# Patient Record
Sex: MALE | Race: WHITE
[De-identification: names, ages, dates, MRNs, and addresses within clinical notes are randomized per-mention and may not be internally consistent; named-entity substitution may affect disease eponyms.]

---

## 2014-10-11 VITALS — SYSTOLIC BLOOD PRESSURE: 136 MMHG | DIASTOLIC BLOOD PRESSURE: 84 MMHG

## 2020-01-20 ENCOUNTER — HOSPITAL ENCOUNTER (OUTPATIENT)
Dept: HOSPITAL 63 - DXRAD | Age: 40
Discharge: HOME | End: 2020-01-20
Attending: PHYSICIAN ASSISTANT
Payer: COMMERCIAL

## 2020-01-20 DIAGNOSIS — S82.831K: Primary | ICD-10-CM
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
## 2020-01-20 PROCEDURE — 73630 X-RAY EXAM OF FOOT: CPT

## 2020-01-20 NOTE — RAD
Examination: FOOT RIGHT 3V

 

History: Fall, pain at the lateral aspect

 

Comparison/Correlation: None

 

Findings: Total of 3 images of the right foot were obtained. Joint spaces 

are within normal limits. No acute displaced fracture or bony destructive 

findings. Nonunion distal fibular fracture is evident. Deformity of the 

distal tibia is notable preserved same previous trauma. Lucency involving 

the distal tibial diametaphyseal region is noted expansile appearance. 

Multiple lucencies involving the distal tibia corresponding to previous 

screw or pin placement noted..

 

 

Impression:

No evidence of acute fracture.

 

Deformity of distal tibia and fibula. These are presumed posttraumatic and

postoperative. Correlate with previous exams available. In particular, 

expansile appearance of the distal tibial metaphysis is present although 

may relate to trauma history. No cortical destruction is seen at this 

site.

 

Electronically signed by: Blaze Lugo MD (1/20/2020 3:05 PM) [facility]

## 2020-11-20 ENCOUNTER — HOSPITAL ENCOUNTER (EMERGENCY)
Dept: HOSPITAL 63 - ER | Age: 40
Discharge: HOME | End: 2020-11-20
Payer: COMMERCIAL

## 2020-11-20 VITALS — WEIGHT: 220.46 LBS | BODY MASS INDEX: 28.29 KG/M2 | HEIGHT: 74 IN

## 2020-11-20 VITALS — DIASTOLIC BLOOD PRESSURE: 79 MMHG | SYSTOLIC BLOOD PRESSURE: 111 MMHG

## 2020-11-20 DIAGNOSIS — M25.571: ICD-10-CM

## 2020-11-20 DIAGNOSIS — Z98.890: ICD-10-CM

## 2020-11-20 DIAGNOSIS — M79.671: Primary | ICD-10-CM

## 2020-11-20 DIAGNOSIS — L53.9: ICD-10-CM

## 2020-11-20 LAB
ALBUMIN SERPL-MCNC: 4.2 G/DL (ref 3.4–5)
ALBUMIN/GLOB SERPL: 1.1 {RATIO} (ref 1–1.7)
ALP SERPL-CCNC: 81 U/L (ref 46–116)
ALT SERPL-CCNC: 38 U/L (ref 16–63)
ANION GAP SERPL CALC-SCNC: 9 MMOL/L (ref 6–14)
AST SERPL-CCNC: 17 U/L (ref 15–37)
BASOPHILS # BLD AUTO: 0.1 X10^3/UL (ref 0–0.2)
BASOPHILS NFR BLD: 1 % (ref 0–3)
BILIRUB SERPL-MCNC: 0.3 MG/DL (ref 0.2–1)
BUN/CREAT SERPL: 15 (ref 6–20)
CA-I SERPL ISE-MCNC: 16 MG/DL (ref 8–26)
CALCIUM SERPL-MCNC: 9.2 MG/DL (ref 8.5–10.1)
CHLORIDE SERPL-SCNC: 103 MMOL/L (ref 98–107)
CO2 SERPL-SCNC: 26 MMOL/L (ref 21–32)
CREAT SERPL-MCNC: 1.1 MG/DL (ref 0.7–1.3)
CRP SERPL-MCNC: 0.9 MG/L (ref 0–3.3)
EOSINOPHIL NFR BLD: 0.1 X10^3/UL (ref 0–0.7)
EOSINOPHIL NFR BLD: 1 % (ref 0–3)
ERYTHROCYTE [DISTWIDTH] IN BLOOD BY AUTOMATED COUNT: 12.8 % (ref 11.5–14.5)
GFR SERPLBLD BASED ON 1.73 SQ M-ARVRAT: 74.9 ML/MIN
GLOBULIN SER-MCNC: 3.7 G/DL (ref 2.2–3.8)
GLUCOSE SERPL-MCNC: 84 MG/DL (ref 70–99)
HCT VFR BLD CALC: 46 % (ref 39–53)
HGB BLD-MCNC: 15.2 G/DL (ref 13–17.5)
LYMPHOCYTES # BLD: 2.7 X10^3/UL (ref 1–4.8)
LYMPHOCYTES NFR BLD AUTO: 31 % (ref 24–48)
MCH RBC QN AUTO: 31 PG (ref 25–35)
MCHC RBC AUTO-ENTMCNC: 33 G/DL (ref 31–37)
MCV RBC AUTO: 94 FL (ref 79–100)
MONO #: 0.7 X10^3/UL (ref 0–1.1)
MONOCYTES NFR BLD: 8 % (ref 0–9)
NEUT #: 5 X10^3UL (ref 1.8–7.7)
NEUTROPHILS NFR BLD AUTO: 58 % (ref 31–73)
PLATELET # BLD AUTO: 256 X10^3/UL (ref 140–400)
POTASSIUM SERPL-SCNC: 3.9 MMOL/L (ref 3.5–5.1)
PROT SERPL-MCNC: 7.9 G/DL (ref 6.4–8.2)
RBC # BLD AUTO: 4.9 X10^6/UL (ref 4.3–5.7)
SODIUM SERPL-SCNC: 138 MMOL/L (ref 136–145)
WBC # BLD AUTO: 8.5 X10^3/UL (ref 4–11)

## 2020-11-20 PROCEDURE — 99284 EMERGENCY DEPT VISIT MOD MDM: CPT

## 2020-11-20 PROCEDURE — 87040 BLOOD CULTURE FOR BACTERIA: CPT

## 2020-11-20 PROCEDURE — 86140 C-REACTIVE PROTEIN: CPT

## 2020-11-20 PROCEDURE — 73630 X-RAY EXAM OF FOOT: CPT

## 2020-11-20 PROCEDURE — 83605 ASSAY OF LACTIC ACID: CPT

## 2020-11-20 PROCEDURE — 73610 X-RAY EXAM OF ANKLE: CPT

## 2020-11-20 PROCEDURE — 85025 COMPLETE CBC W/AUTO DIFF WBC: CPT

## 2020-11-20 PROCEDURE — 36415 COLL VENOUS BLD VENIPUNCTURE: CPT

## 2020-11-20 PROCEDURE — 80053 COMPREHEN METABOLIC PANEL: CPT

## 2020-11-20 NOTE — RAD
Exam: Right ankle 3 views. Right foot 3 views

 

INDICATION: Dorsal foot pain, rule out abscess

 

TECHNIQUE: Frontal, lateral and oblique views of the right foot and right 

ankle

 

Comparisons: Foot radiographs 1/20/2020

 

FINDINGS:

 

Foot:

Bone mineralization is normal. No acute or healed fractures. Soft tissues 

are unremarkable. Joint spaces are well-maintained.

 

Ankle:

Chronic fracture deformity at the distal tibia and fibula with arthrodesis

at the tibiotalar joint. No acute fractures are identified. Soft tissues 

are unremarkable.

 

IMPRESSION:

1.  Chronic changes at the right ankle with arthrodesis of the tibiotalar 

joint. No acute fractures seen.

2.  No acute osseous abnormality at the right foot.

 

Electronically signed by: José Barry MD (11/20/2020 8:58 PM) [facility]

## 2020-11-20 NOTE — PHYS DOC
Past History


Past Medical History:  No Pertinent History


Past Surgical History:  Other


Additional Past Surgical Histo:  right ankle surgery x 7, back surgery


Alcohol Use:  None


Drug Use:  None





Adult General


Chief Complaint


Chief Complaint:  FOOT INJURY PAIN





HPI


HPI





Patient is a 40-year-old male presenting with right lower extremity problem.  

Patient has history of traumatic fall from roof and numerous orthopedic 

surgeries to his right lower extremity.  He reports having x7 surgeries to his 

tibia/fibula and ankle region which was complicated with infection.  He cites 

being on long-term antibiotic and having PICC line placed approximately 1 year 

ago for extended IV antibiotic use at home, he is unsure what bacteria was 

involved, he is unsure if he has ever suffered from MRSA.  Nonetheless, patient 

was told by prior orthopedic surgeon that if redness/other signs of cellulitis 

occur to take previously prescribed linezolid 600 mg twice daily and report to 

nearest ER for evaluation.  Patient reports working per usual today and wearing 

typical work boots when he discovered approximately 2 x 3 well demarcated 

redness to the ventral portion of his right foot.  Over the course of 3 hours, 

redness, erythema, in pain increased prompting him to take 600 mg linezolid p.o.

and present to our ER for evaluation.  Patient denies any prodromal symptoms, 

has not been sick or symptomatic prior to noticing redness on ventral portion of

the foot this evening.  No recent trauma, no changes in medication, no obvious 

inoculation or other inciting event.  No fever, no COVID-19 contact, no changes 

in motor and/or sensory function.





Review of Systems


Review of Systems


Fourteen body systems of review of systems have been reviewed. See HPI for 

pertinent positives and negative responses, other wise all other systems are 

negative, non-pertinent or non-contributory





Allergies


Allergies





Allergies








Coded Allergies Type Severity Reaction Last Updated Verified


 


  No Known Drug Allergies    10/11/14 No











Physical Exam


Physical Exam


Constitutional: Well developed, well nourished, no acute distress, non-toxic 

appearance. 


HENT: Normocephalic, atraumatic, bilateral external ears normal, oropharynx 

moist, no oral exudates, nose normal. 


Eyes: PERRLA, EOMI, conjunctiva normal, no discharge.  


Neck: Normal range of motion, no tenderness, supple, no stridor.  


Cardiovascular: Heart rate regular, sinus rhythm, no murmurs rubs or gallops


Lungs & Thorax:  Bilateral breath sounds clear to auscultation 


Abdomen: Bowel sounds normal, soft, no tenderness, no masses, no pulsatile 

masses.  Nonsurgical abdomen, no peritoneal signs


Skin: Warm, dry, no rash.  Well demarcated area concerning for contact 

dermatitis versus cellulitis of ventral portion of right foot approximately 4 x 

6 cm in size, mild involvement to inferior portion of right lateral malleolus.  

No crepitus, fluctuance, mobile masses concerning for abscess or fluid 

collection, no elevation in skin or other tactile findings


Back: No tenderness, no CVA tenderness.  


Extremities: No cyanosis, no clubbing, no edema.  Range of motion diminished in 

all planes of right lower extremity which is consistent status post numerous 

orthopedic surgeries of right lower extremity.  Tenderness over skin findings in

question noted above without any visual and/or palpable abnormalities


Neurologic: Alert and oriented X 3, grossly normal motor & sensory function, no 

focal deficits noted. 


Psychologic: Affect normal, judgement normal, anxious mood





Current Patient Data


Vital Signs





                                   Vital Signs








  Date Time  Temp Pulse Resp B/P (MAP) Pulse Ox O2 Delivery O2 Flow Rate FiO2


 


11/20/20 20:00 98.4 77 16 140/81 (100) 97   








Lab Results





Laboratory Tests








Test


 11/20/20


20:25


 


White Blood Count


 8.5 x10^3/uL


(4.0-11.0)


 


Red Blood Count


 4.90 x10^6/uL


(4.30-5.70)


 


Hemoglobin


 15.2 g/dL


(13.0-17.5)


 


Hematocrit


 46.0 %


(39.0-53.0)


 


Mean Corpuscular Volume 94 fL () 


 


Mean Corpuscular Hemoglobin 31 pg (25-35) 


 


Mean Corpuscular Hemoglobin


Concent 33 g/dL


(31-37)


 


Red Cell Distribution Width


 12.8 %


(11.5-14.5)


 


Platelet Count


 256 x10^3/uL


(140-400)


 


Neutrophils (%) (Auto) 58 % (31-73) 


 


Lymphocytes (%) (Auto) 31 % (24-48) 


 


Monocytes (%) (Auto) 8 % (0-9) 


 


Eosinophils (%) (Auto) 1 % (0-3) 


 


Basophils (%) (Auto) 1 % (0-3) 


 


Neutrophils # (Auto)


 5.0 x10^3uL


(1.8-7.7)


 


Lymphocytes # (Auto)


 2.7 x10^3/uL


(1.0-4.8)


 


Monocytes # (Auto)


 0.7 x10^3/uL


(0.0-1.1)


 


Eosinophils # (Auto)


 0.1 x10^3/uL


(0.0-0.7)


 


Basophils # (Auto)


 0.1 x10^3/uL


(0.0-0.2)


 


Sodium Level


 138 mmol/L


(136-145)


 


Potassium Level


 3.9 mmol/L


(3.5-5.1)


 


Chloride Level


 103 mmol/L


()


 


Carbon Dioxide Level


 26 mmol/L


(21-32)


 


Anion Gap 9 (6-14) 


 


Blood Urea Nitrogen


 16 mg/dL


(8-26)


 


Creatinine


 1.1 mg/dL


(0.7-1.3)


 


Estimated GFR


(Cockcroft-Gault) 74.9 





 


BUN/Creatinine Ratio 15 (6-20) 


 


Glucose Level


 84 mg/dL


(70-99)


 


Lactic Acid Level


 1.1 mmol/L


(0.4-2.0)


 


Calcium Level


 9.2 mg/dL


(8.5-10.1)


 


Total Bilirubin


 0.3 mg/dL


(0.2-1.0)


 


Aspartate Amino Transf


(AST/SGOT) 17 U/L (15-37) 





 


Alanine Aminotransferase


(ALT/SGPT) 38 U/L (16-63) 





 


Alkaline Phosphatase


 81 U/L


()


 


C-Reactive Protein


 0.9 mg/L


(0-3.3)


 


Total Protein


 7.9 g/dL


(6.4-8.2)


 


Albumin


 4.2 g/dL


(3.4-5.0)


 


Albumin/Globulin Ratio 1.1 (1.0-1.7) 











EKG


EKG


[]





Radiology/Procedures


Radiology/Procedures





PROCEDURE: FOOT RIGHT 3V





Exam: Right ankle 3 views. Right foot 3 views


 


INDICATION: Dorsal foot pain, rule out abscess


 


TECHNIQUE: Frontal, lateral and oblique views of the right foot and right 


ankle


 


Comparisons: Foot radiographs 1/20/2020


 


FINDINGS:


 


Foot:


Bone mineralization is normal. No acute or healed fractures. Soft tissues 


are unremarkable. Joint spaces are well-maintained.


 


Ankle:


Chronic fracture deformity at the distal tibia and fibula with arthrodesis


at the tibiotalar joint. No acute fractures are identified. Soft tissues 


are unremarkable.


 


IMPRESSION:


1.  Chronic changes at the right ankle with arthrodesis of the tibiotalar 


joint. No acute fractures seen.


2.  No acute osseous abnormality at the right foot.


 


Electronically signed by: Cristiano Shah MD (11/20/2020 8:58 PM) [facility]














DICTATED AND SIGNED BY:     CRISTIANO SHAH MD


DATE:     11/20/20 2058





Heart Score


Risk Factors:


Risk Factors:  DM, Current or recent (<one month) smoker, HTN, HLP, family 

history of CAD, obesity.


Risk Scores:


Risk Factors:  DM, Current or recent (<one month) smoker, HTN, HLP, family 

history of CAD, obesity.





Course & Med Decision Making


Course & Med Decision Making


Pertinent Labs and Imaging studies reviewed. (See chart for details)





I discussed most likely diagnosis of contact dermatitis versus process.  650 mg 

Tylenol administered in ER with improvement in pain.  Throughout course of ER 

visit, patient's area of concern diminished in size and redness.  Because of 

this, I discussed most likely diagnosis of contact dermatitis versus other 

transient and likely self-limiting process.  With that said, patient is 

extremely high risk.  He has history of numerous surgeries and likely 

osteomyelitis although patient is not entirely sure of his diagnosis.  He 

reports having good access to primary care physician and orthopedic surgeon, he 

reports he would feel more comfortable if he continued taking the 600 mg twice 

daily linezolid until seen in outpatient setting by specialist, I am okay with 

this given patient's history of infectious pathology to right lower extremity 

and fact that I do not accurately know his entire past medical history.  I 

advised patient to avoid potential new allergens such as soaps or new socks and 

change his work boots to his other pair temporarily to ensure symptomatic 

resolution.  Area in question outlined with marker pending for review by primary

 care physician and specialist at outpatient follow-up.  Strict return 

precautions discussed with good understanding by patient, all questions and 

concerns addressed prior to ER departure in improved condition





Dragon Disclaimer


Dragon Disclaimer


This electronic medical record was generated, in whole or in part, using a voice

 recognition dictation system.





Departure


Departure:


Impression:  


   Primary Impression:  


   Skin problem


Disposition:  01 DC HOME SELF CARE/HOMELESS


Condition:  IMPROVED


Referrals:  


JENNIFER SAMUEL (PCP)





Additional Instructions:  


As discussed prior to ER departure, please call your primary care physician and 

orthopedic physician first thing Monday morning to schedule outpatient follow-up

 regarding your right lower extremity skin condition


As advised by your primary orthopedic and given your high risk for potential 

cellulitis and worsened complications, I would continue taking your linezolid 

600 mg twice daily until further notice


I would advise you to avoid work boots as much as possible and new allergens to 

skin area in question


If any concerning signs or symptoms present prior to outpatient follow-up please

 do not hesitate to come back for repeat examination


It was a pleasure to take care of you and I wish you a speedy recovery!











BENNY GARCIA DO                 Nov 20, 2020 20:33

## 2021-04-15 ENCOUNTER — HOSPITAL ENCOUNTER (EMERGENCY)
Dept: HOSPITAL 63 - ER | Age: 41
Discharge: HOME | End: 2021-04-15
Payer: COMMERCIAL

## 2021-04-15 VITALS — BODY MASS INDEX: 26.54 KG/M2 | HEIGHT: 74 IN | WEIGHT: 206.79 LBS

## 2021-04-15 VITALS — SYSTOLIC BLOOD PRESSURE: 162 MMHG | DIASTOLIC BLOOD PRESSURE: 94 MMHG
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
## 2021-04-15 DIAGNOSIS — S00.81XA: ICD-10-CM
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
## 2021-04-15 DIAGNOSIS — S96.912A: Primary | ICD-10-CM
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
## 2021-04-15 DIAGNOSIS — S80.11XA: ICD-10-CM

## 2021-04-15 DIAGNOSIS — S60.512A: ICD-10-CM

## 2021-04-15 DIAGNOSIS — S40.212A: ICD-10-CM

## 2021-04-15 PROCEDURE — 72125 CT NECK SPINE W/O DYE: CPT

## 2021-04-15 PROCEDURE — 99285 EMERGENCY DEPT VISIT HI MDM: CPT

## 2021-04-15 PROCEDURE — 96372 THER/PROPH/DIAG INJ SC/IM: CPT

## 2021-04-15 PROCEDURE — 73030 X-RAY EXAM OF SHOULDER: CPT

## 2021-04-15 PROCEDURE — 73110 X-RAY EXAM OF WRIST: CPT

## 2021-04-15 PROCEDURE — 73080 X-RAY EXAM OF ELBOW: CPT

## 2021-04-15 PROCEDURE — 73590 X-RAY EXAM OF LOWER LEG: CPT

## 2021-04-15 PROCEDURE — 70450 CT HEAD/BRAIN W/O DYE: CPT
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
